# Patient Record
Sex: MALE | Race: WHITE | NOT HISPANIC OR LATINO | Employment: FULL TIME | URBAN - METROPOLITAN AREA
[De-identification: names, ages, dates, MRNs, and addresses within clinical notes are randomized per-mention and may not be internally consistent; named-entity substitution may affect disease eponyms.]

---

## 2019-02-13 ENCOUNTER — EVALUATION (OUTPATIENT)
Dept: PHYSICAL THERAPY | Facility: CLINIC | Age: 44
End: 2019-02-13
Payer: COMMERCIAL

## 2019-02-13 ENCOUNTER — TRANSCRIBE ORDERS (OUTPATIENT)
Dept: PHYSICAL THERAPY | Facility: CLINIC | Age: 44
End: 2019-02-13

## 2019-02-13 ENCOUNTER — NURSE TRIAGE (OUTPATIENT)
Dept: PHYSICAL THERAPY | Facility: OTHER | Age: 44
End: 2019-02-13

## 2019-02-13 ENCOUNTER — TELEPHONE (OUTPATIENT)
Dept: PHYSICAL THERAPY | Facility: OTHER | Age: 44
End: 2019-02-13

## 2019-02-13 ENCOUNTER — APPOINTMENT (EMERGENCY)
Dept: CT IMAGING | Facility: HOSPITAL | Age: 44
End: 2019-02-13
Payer: COMMERCIAL

## 2019-02-13 ENCOUNTER — APPOINTMENT (EMERGENCY)
Dept: RADIOLOGY | Facility: HOSPITAL | Age: 44
End: 2019-02-13
Payer: COMMERCIAL

## 2019-02-13 ENCOUNTER — HOSPITAL ENCOUNTER (EMERGENCY)
Facility: HOSPITAL | Age: 44
Discharge: HOME | End: 2019-02-13
Attending: EMERGENCY MEDICINE
Payer: COMMERCIAL

## 2019-02-13 VITALS
OXYGEN SATURATION: 100 % | HEART RATE: 57 BPM | DIASTOLIC BLOOD PRESSURE: 85 MMHG | WEIGHT: 234 LBS | RESPIRATION RATE: 18 BRPM | SYSTOLIC BLOOD PRESSURE: 134 MMHG | TEMPERATURE: 98.2 F

## 2019-02-13 VITALS — HEART RATE: 67 BPM | SYSTOLIC BLOOD PRESSURE: 110 MMHG | TEMPERATURE: 99.4 F | DIASTOLIC BLOOD PRESSURE: 70 MMHG

## 2019-02-13 DIAGNOSIS — M54.41 ACUTE BILATERAL LOW BACK PAIN WITH RIGHT-SIDED SCIATICA: Primary | ICD-10-CM

## 2019-02-13 DIAGNOSIS — M54.41 ACUTE BACK PAIN WITH SCIATICA, RIGHT: Primary | ICD-10-CM

## 2019-02-13 DIAGNOSIS — M54.16 LEFT LUMBAR RADICULITIS: Primary | ICD-10-CM

## 2019-02-13 LAB
ANION GAP SERPL CALCULATED.3IONS-SCNC: 7 MMOL/L (ref 4–13)
BACTERIA UR QL AUTO: ABNORMAL /HPF
BASOPHILS # BLD AUTO: 0.03 THOUSANDS/ΜL (ref 0–0.1)
BASOPHILS NFR BLD AUTO: 0 % (ref 0–1)
BILIRUB UR QL STRIP: NEGATIVE
BUN SERPL-MCNC: 7 MG/DL (ref 5–25)
CALCIUM SERPL-MCNC: 8.8 MG/DL (ref 8.3–10.1)
CHLORIDE SERPL-SCNC: 101 MMOL/L (ref 100–108)
CLARITY UR: CLEAR
CO2 SERPL-SCNC: 30 MMOL/L (ref 21–32)
COLOR UR: YELLOW
CREAT SERPL-MCNC: 0.88 MG/DL (ref 0.6–1.3)
EOSINOPHIL # BLD AUTO: 0.17 THOUSAND/ΜL (ref 0–0.61)
EOSINOPHIL NFR BLD AUTO: 2 % (ref 0–6)
ERYTHROCYTE [DISTWIDTH] IN BLOOD BY AUTOMATED COUNT: 12.4 % (ref 11.6–15.1)
GFR SERPL CREATININE-BSD FRML MDRD: 104 ML/MIN/1.73SQ M
GLUCOSE SERPL-MCNC: 92 MG/DL (ref 65–140)
GLUCOSE UR STRIP-MCNC: NEGATIVE MG/DL
HCT VFR BLD AUTO: 41.5 % (ref 36.5–49.3)
HGB BLD-MCNC: 14.4 G/DL (ref 12–17)
HGB UR QL STRIP.AUTO: ABNORMAL
HOLD SPECIMEN: NORMAL
IMM GRANULOCYTES # BLD AUTO: 0.01 THOUSAND/UL (ref 0–0.2)
IMM GRANULOCYTES NFR BLD AUTO: 0 % (ref 0–2)
KETONES UR STRIP-MCNC: NEGATIVE MG/DL
LEUKOCYTE ESTERASE UR QL STRIP: NEGATIVE
LYMPHOCYTES # BLD AUTO: 1.47 THOUSANDS/ΜL (ref 0.6–4.47)
LYMPHOCYTES NFR BLD AUTO: 19 % (ref 14–44)
MCH RBC QN AUTO: 31.4 PG (ref 26.8–34.3)
MCHC RBC AUTO-ENTMCNC: 34.7 G/DL (ref 31.4–37.4)
MCV RBC AUTO: 90 FL (ref 82–98)
MONOCYTES # BLD AUTO: 0.48 THOUSAND/ΜL (ref 0.17–1.22)
MONOCYTES NFR BLD AUTO: 6 % (ref 4–12)
NEUTROPHILS # BLD AUTO: 5.77 THOUSANDS/ΜL (ref 1.85–7.62)
NEUTS SEG NFR BLD AUTO: 73 % (ref 43–75)
NITRITE UR QL STRIP: NEGATIVE
NON-SQ EPI CELLS URNS QL MICRO: ABNORMAL /HPF
NRBC BLD AUTO-RTO: 0 /100 WBCS
PH UR STRIP.AUTO: 6 [PH] (ref 4.5–8)
PLATELET # BLD AUTO: 194 THOUSANDS/UL (ref 149–390)
PMV BLD AUTO: 10.8 FL (ref 8.9–12.7)
POTASSIUM SERPL-SCNC: 3.9 MMOL/L (ref 3.5–5.3)
PROT UR STRIP-MCNC: NEGATIVE MG/DL
RBC # BLD AUTO: 4.59 MILLION/UL (ref 3.88–5.62)
RBC #/AREA URNS AUTO: ABNORMAL /HPF
SODIUM SERPL-SCNC: 138 MMOL/L (ref 136–145)
SP GR UR STRIP.AUTO: <=1.005 (ref 1–1.03)
UROBILINOGEN UR QL STRIP.AUTO: 0.2 E.U./DL
WBC # BLD AUTO: 7.93 THOUSAND/UL (ref 4.31–10.16)
WBC #/AREA URNS AUTO: ABNORMAL /HPF

## 2019-02-13 PROCEDURE — 96374 THER/PROPH/DIAG INJ IV PUSH: CPT

## 2019-02-13 PROCEDURE — G8991 OTHER PT/OT GOAL STATUS: HCPCS | Performed by: PHYSICAL THERAPIST

## 2019-02-13 PROCEDURE — 36415 COLL VENOUS BLD VENIPUNCTURE: CPT | Performed by: PHYSICIAN ASSISTANT

## 2019-02-13 PROCEDURE — 80048 BASIC METABOLIC PNL TOTAL CA: CPT | Performed by: PHYSICIAN ASSISTANT

## 2019-02-13 PROCEDURE — 96375 TX/PRO/DX INJ NEW DRUG ADDON: CPT

## 2019-02-13 PROCEDURE — 73502 X-RAY EXAM HIP UNI 2-3 VIEWS: CPT

## 2019-02-13 PROCEDURE — 97162 PT EVAL MOD COMPLEX 30 MIN: CPT | Performed by: PHYSICAL THERAPIST

## 2019-02-13 PROCEDURE — 87591 N.GONORRHOEAE DNA AMP PROB: CPT | Performed by: PHYSICIAN ASSISTANT

## 2019-02-13 PROCEDURE — 85025 COMPLETE CBC W/AUTO DIFF WBC: CPT | Performed by: PHYSICIAN ASSISTANT

## 2019-02-13 PROCEDURE — 97110 THERAPEUTIC EXERCISES: CPT | Performed by: PHYSICAL THERAPIST

## 2019-02-13 PROCEDURE — 99285 EMERGENCY DEPT VISIT HI MDM: CPT

## 2019-02-13 PROCEDURE — 36415 COLL VENOUS BLD VENIPUNCTURE: CPT

## 2019-02-13 PROCEDURE — 74176 CT ABD & PELVIS W/O CONTRAST: CPT

## 2019-02-13 PROCEDURE — 97140 MANUAL THERAPY 1/> REGIONS: CPT | Performed by: PHYSICAL THERAPIST

## 2019-02-13 PROCEDURE — 81001 URINALYSIS AUTO W/SCOPE: CPT

## 2019-02-13 PROCEDURE — 72100 X-RAY EXAM L-S SPINE 2/3 VWS: CPT

## 2019-02-13 PROCEDURE — 81003 URINALYSIS AUTO W/O SCOPE: CPT

## 2019-02-13 PROCEDURE — 87491 CHLMYD TRACH DNA AMP PROBE: CPT | Performed by: PHYSICIAN ASSISTANT

## 2019-02-13 PROCEDURE — G8990 OTHER PT/OT CURRENT STATUS: HCPCS | Performed by: PHYSICAL THERAPIST

## 2019-02-13 PROCEDURE — 97112 NEUROMUSCULAR REEDUCATION: CPT | Performed by: PHYSICAL THERAPIST

## 2019-02-13 RX ORDER — ONDANSETRON 2 MG/ML
4 INJECTION INTRAMUSCULAR; INTRAVENOUS ONCE
Status: COMPLETED | OUTPATIENT
Start: 2019-02-13 | End: 2019-02-13

## 2019-02-13 RX ORDER — NAPROXEN 500 MG/1
500 TABLET ORAL 2 TIMES DAILY WITH MEALS
Qty: 30 TABLET | Refills: 0 | Status: SHIPPED | OUTPATIENT
Start: 2019-02-13 | End: 2021-04-02

## 2019-02-13 RX ORDER — CYCLOBENZAPRINE HCL 10 MG
10 TABLET ORAL ONCE
Status: COMPLETED | OUTPATIENT
Start: 2019-02-13 | End: 2019-02-13

## 2019-02-13 RX ORDER — CYCLOBENZAPRINE HCL 10 MG
10 TABLET ORAL 2 TIMES DAILY PRN
Qty: 9 TABLET | Refills: 0 | Status: SHIPPED | OUTPATIENT
Start: 2019-02-13 | End: 2021-04-02

## 2019-02-13 RX ORDER — KETOROLAC TROMETHAMINE 30 MG/ML
15 INJECTION, SOLUTION INTRAMUSCULAR; INTRAVENOUS ONCE
Status: COMPLETED | OUTPATIENT
Start: 2019-02-13 | End: 2019-02-13

## 2019-02-13 RX ORDER — MORPHINE SULFATE 10 MG/ML
8 INJECTION, SOLUTION INTRAMUSCULAR; INTRAVENOUS ONCE
Status: COMPLETED | OUTPATIENT
Start: 2019-02-13 | End: 2019-02-13

## 2019-02-13 RX ADMIN — CYCLOBENZAPRINE HYDROCHLORIDE 10 MG: 10 TABLET, FILM COATED ORAL at 11:51

## 2019-02-13 RX ADMIN — ONDANSETRON 4 MG: 2 INJECTION INTRAMUSCULAR; INTRAVENOUS at 13:39

## 2019-02-13 RX ADMIN — MORPHINE SULFATE 8 MG: 10 INJECTION, SOLUTION INTRAMUSCULAR; INTRAVENOUS at 13:44

## 2019-02-13 RX ADMIN — KETOROLAC TROMETHAMINE 15 MG: 30 INJECTION, SOLUTION INTRAMUSCULAR at 11:49

## 2019-02-13 NOTE — ED PROVIDER NOTES
History  Chief Complaint   Patient presents with    Flank Pain     Pt states that he has kidney stones that were diagnosed on Sunday at an urgent care from blood in the urine  Patient presents emergency room with a 3 day history of left flank pain  States he was seen at an urgent care center and they dipped his urine which was positive for blood in the also did lab work and told him clinically that he had a kidney stone  Patient is quite comfortable at rest   Complains of pain that radiates to his left groin  He has had a history of left groin pain on and off for the past few months  History provided by:  Patient  Flank Pain   Pain location:  L flank  Pain radiates to:  Groin  Pain severity:  Moderate  Onset quality:  Gradual  Duration:  3 days  Timing:  Constant  Progression:  Waxing and waning  Chronicity:  New  Context: not alcohol use, not awakening from sleep, not diet changes, not eating, not laxative use, not medication withdrawal, not previous surgeries, not recent illness, not recent sexual activity, not recent travel, not retching, not sick contacts, not suspicious food intake and not trauma    Relieved by:  None tried  Worsened by: Movement and position changes  Ineffective treatments:  None tried  Associated symptoms: dysuria    Associated symptoms: no anorexia, no chest pain, no chills, no cough, no diarrhea, no fatigue, no fever, no nausea, no shortness of breath, no sore throat and no vomiting    Risk factors: no alcohol abuse, no aspirin use, not elderly, has not had multiple surgeries, no NSAID use, not obese and no recent hospitalization        None       History reviewed  No pertinent past medical history  Past Surgical History:   Procedure Laterality Date    HAND SURGERY         History reviewed  No pertinent family history  I have reviewed and agree with the history as documented      Social History     Tobacco Use    Smoking status: Current Every Day Smoker    Smokeless tobacco: Never Used   Substance Use Topics    Alcohol use: Yes     Comment: once a month    Drug use: Never        Review of Systems   Constitutional: Positive for activity change  Negative for chills, fatigue and fever  HENT: Negative for congestion, dental problem, ear discharge, ear pain, postnasal drip, rhinorrhea and sore throat  Eyes: Negative for pain, discharge, redness and itching  Respiratory: Negative for cough and shortness of breath  Cardiovascular: Negative for chest pain and palpitations  Gastrointestinal: Negative for abdominal pain, anorexia, diarrhea, nausea and vomiting  Endocrine: Negative for cold intolerance, heat intolerance, polydipsia, polyphagia and polyuria  Genitourinary: Positive for dysuria and flank pain  Negative for frequency, testicular pain and urgency  Musculoskeletal: Positive for back pain  Skin: Negative for color change, pallor and rash  Neurological: Negative for headaches  Psychiatric/Behavioral: Negative for confusion  All other systems reviewed and are negative  Physical Exam  Physical Exam   Constitutional: He is oriented to person, place, and time  He appears well-developed and well-nourished  No distress  HENT:   Head: Normocephalic  Right Ear: External ear normal    Left Ear: External ear normal    Nose: Nose normal    Eyes: Conjunctivae are normal  Right eye exhibits no discharge  Left eye exhibits no discharge  Neck: Neck supple  Cardiovascular: Normal rate, regular rhythm and normal heart sounds  Exam reveals no gallop and no friction rub  No murmur heard  Pulmonary/Chest: Effort normal and breath sounds normal  No stridor  No respiratory distress  He has no wheezes  He has no rales  Abdominal: Soft  He exhibits no distension and no mass  There is no tenderness  There is no guarding  Negative costovertebral angle tenderness   Musculoskeletal: He exhibits no edema     Neurological: He is alert and oriented to person, place, and time  Skin: Skin is warm  Capillary refill takes less than 2 seconds  He is not diaphoretic  Psychiatric: He has a normal mood and affect  His behavior is normal  Judgment and thought content normal    Nursing note and vitals reviewed  Vital Signs  ED Triage Vitals [02/13/19 1021]   Temperature Pulse Respirations Blood Pressure SpO2   98 2 °F (36 8 °C) 60 16 149/85 99 %      Temp Source Heart Rate Source Patient Position - Orthostatic VS BP Location FiO2 (%)   Oral Monitor Sitting Left arm --      Pain Score       8           Vitals:    02/13/19 1021 02/13/19 1231   BP: 149/85 134/85   Pulse: 60 57   Patient Position - Orthostatic VS: Sitting Sitting       Visual Acuity      ED Medications  Medications   ketorolac (TORADOL) injection 15 mg (15 mg Intravenous Given 2/13/19 1149)   cyclobenzaprine (FLEXERIL) tablet 10 mg (10 mg Oral Given 2/13/19 1151)   morphine (PF) 10 mg/mL injection 8 mg (8 mg Intravenous Given 2/13/19 1344)   ondansetron (ZOFRAN) injection 4 mg (4 mg Intravenous Given 2/13/19 1339)       Diagnostic Studies  Results Reviewed     Procedure Component Value Units Date/Time    [place] draw [[id number]] Collected:  02/13/19 1117    Lab Status:  Final result Specimen:  Blood from Arm, Right Updated:  02/13/19 1304    Narrative: The following orders were created for panel order [place] draw  Procedure                               Abnormality         Status                     ---------                               -----------         ------                     [place] / Yellow tube on IMDF[[id number]]                      Final result               Green / Black tube on ONKG[[id number]]                       Final result               Lavender Top 3 ml on hold[[id number]]                        Final result                 Please view results for these tests on the individual orders      Urine Microscopic [[id number]]  (Abnormal) Collected:  02/13/19 1159    Lab Status:  Final result Specimen:  Urine, Clean Catch Updated:  02/13/19 1300     RBC, UA 0-1 /hpf      WBC, UA 1-2 /hpf      Epithelial Cells Occasional /hpf      Bacteria, UA Occasional /hpf     Basic metabolic panel [[id number]] Collected:  02/13/19 1117    Lab Status:  Final result Specimen:  Blood from Arm, Right Updated:  02/13/19 1205     Sodium 138 mmol/L      Potassium 3 9 mmol/L      Chloride 101 mmol/L      CO2 30 mmol/L      ANION GAP 7 mmol/L      BUN 7 mg/dL      Creatinine 0 88 mg/dL      Glucose 92 mg/dL      Calcium 8 8 mg/dL      eGFR 104 ml/min/1 73sq m     Narrative:       National Kidney Disease Education Program recommendations are as follows:  GFR calculation is accurate only with a steady state creatinine  Chronic Kidney disease less than 60 ml/min/1 73 sq  meters  Kidney failure less than 15 ml/min/1 73 sq  meters  [facility] amplified DNA by PCR [[id number]] Collected:  02/13/19 1153    Lab Status:   In process Specimen:  Urine, Other Updated:  02/13/19 1200    CBC and differential [[id number]] Collected:  02/13/19 1117    Lab Status:  Final result Specimen:  Blood from Arm, Right Updated:  02/13/19 1200     WBC 7 93 Thousand/uL      RBC 4 59 Million/uL      Hemoglobin 14 4 g/dL      Hematocrit 41 5 %      MCV 90 fL      MCH 31 4 pg      MCHC 34 7 g/dL      RDW 12 4 %      MPV 10 8 fL      Platelets 697 Thousands/uL      nRBC 0 /100 WBCs      Neutrophils Relative 73 %      Immat GRANS % 0 %      Lymphocytes Relative 19 %      Monocytes Relative 6 %      Eosinophils Relative 2 %      Basophils Relative 0 %      Neutrophils Absolute 5 77 Thousands/µL      Immature Grans Absolute 0 01 Thousand/uL      Lymphocytes Absolute 1 47 Thousands/µL      Monocytes Absolute 0 48 Thousand/µL      Eosinophils Absolute 0 17 Thousand/µL      Basophils Absolute 0 03 Thousands/µL     ED Urine Macroscopic [[id number]]  (Abnormal) Collected:  02/13/19 1159    Lab Status:  Final result Specimen:  Urine Updated:  02/13/19 1156     Color, UA Yellow     Clarity, UA Clear     pH, UA 6 0     Leukocytes, UA Negative     Nitrite, UA Negative     Protein, UA Negative mg/dl      Glucose, UA Negative mg/dl      Ketones, UA Negative mg/dl      Urobilinogen, UA 0 2 E U /dl      Bilirubin, UA Negative     Blood, UA Trace     Specific Gravity, UA <=1 005    Narrative:       CLINITEK RESULT                 XR spine lumbar 2 or 3 views injury   ED Interpretation by Emilio Boxer, PA-C (02/13 1412)   No fracture      Final Result by Richard Bourne DO (02/13 1541)      No acute findings  Mild degenerative changes visualized lower thoracic spine  Workstation performed: [id number]         XR hip/pelv 2-3 vws left if performed   ED Interpretation by Emilio Boxer, PA-C (02/13 1413)   No acute bony abnormality      Final Result by Richard Bourne DO (02/13 1539)      No acute osseous abnormality  Moderately advanced right hip osteoarthritis  Workstation performed: [id number]         CT renal stone study abdomen pelvis without contrast   ED Interpretation by Emilio Boxer, PA-C (02/13 1411)   No urinary tract calculi or hydronephrosis  Final Result by Dillon Rowland MD (02/13 1242)      No urinary tract calculi or hydronephrosis  Workstation performed: [id number]                    Procedures  Procedures       Phone Contacts  ED Phone Contact    ED Course  ED Course as of Feb 13 1709   Wed Feb 13, 2019   1425 Patient is feeling better with the medications  Patient is going to be discharged with the treatment for lumbar radiculopathy he was given a prescription for Naprosyn as well as Flexeril  Was given a referral to the [facility] asked him to follow up with the family physician in 2 weeks to have a recheck urine to make sure that his hematuria is resolved  Is an active smoker and has not increased risks of bladder cancer as well as a renal cell carcinoma    He understands and will follow up in set himself up with a family physician  Should he have any further problems, he will return to the emergency room for repeat exam                                   MDM  Number of Diagnoses or Management Options  Left lumbar radiculitis: new and requires workup     Amount and/or Complexity of Data Reviewed  Clinical lab tests: ordered and reviewed  Tests in the radiology section of CPT®: ordered and reviewed  Tests in the medicine section of CPT®: ordered and reviewed    Risk of Complications, Morbidity, and/or Mortality  Presenting problems: high  Diagnostic procedures: high  Management options: high  General comments: Patient presents emergency room with the left flank pain  Was seen at an urgent care center, 2 days ago who clinically diagnosed with a kidney stone and told him that he had abnormal laboratory test and positive blood in his urine  Presents to the emergency room today because he has persistent pain  States the pain is worse with range of motion  He denies any numbness, tingling, weakness  He does have radicular symptoms of pain into his groin and left thigh  He was seen and examined  Clinically he had a musculoskeletal strain of his lumbar spine  He had normal reflex exam   He was medicated with Toradol, morphine, Zofran and had good results from this medication  He was insistent on a CT scan of his abdomen and pelvis to rule out a stone which was perfectly acceptable  This scan was completely normal   Patient was discharged home with a prescription for Naprosyn and Flexeril  Was referred to the [address] for further evaluation of his back pain and treatment  He was given a pamphlet to of find a primary care provider within the [facility]  Should his symptoms worsen in the meantime, we will see her back in the emergency room      Patient Progress  Patient progress: stable      Disposition  Final diagnoses:   Left lumbar radiculitis - L2-3 left radiculopathy Time reflects when diagnosis was documented in both MDM as applicable and the Disposition within this note     Time User Action Codes Description Comment    2/13/2019  2:22 PM Lisa Ortiz Mariama [M54 16] Left lumbar radiculitis     2/13/2019  2:22 PM Cleopatra Winters [M54 16] Left lumbar radiculitis L2-3 left radiculopathy      ED Disposition     ED Disposition Condition Date/Time Comment    Discharge Stable Wed Feb 13, 2019  2:22 PM Conner Bolaños discharge to home/self care              Follow-up Information    None         Discharge Medication List as of 2/13/2019  2:24 PM      START taking these medications    Details   cyclobenzaprine (FLEXERIL) 10 mg tablet Take 1 tablet (10 mg total) by mouth 2 (two) times a day as needed for muscle spasms for up to 9 days, Starting Wed 2/13/2019, Until Fri 2/22/2019, Normal      naproxen (NAPROSYN) 500 mg tablet Take 1 tablet (500 mg total) by mouth 2 (two) times a day with meals, Starting Wed 2/13/2019, Normal               ED Provider  Electronically Signed by           Raquel Mishra PA-C  02/13/19 2808

## 2019-02-13 NOTE — LETTER
2019    Jacey Tuttle MD  [address]  [address]  [address]    Patient: Isaura Scott  YOB: 1975   Date of Visit: 2019      Dear Dr Brannon Brochure:    One of your patients, Isaura Scott, was referred to me by the [facility] program   Please see the evaluation summary attached below  If care is required beyond 30 days to help your patient reach their goals, I will send you a request for signature on the plan of care  If you have any questions or concerns, please don't hesitate to call  Sincerely,    Eric Donahue, PT      Primary Care Provider:      I certify that I have read the below Plan of Care and certify the need for these services furnished under this plan of treatment while under my care  Jacey Tuttle MD  [address]  [address]  [facility] [address]  [place] [place]           PT Evaluation     Today's date: 2019  Patient name: Isaura Scott  : 1975  MRN: [id number]  Referring provider: Lani Sahni, PT  Dx:   Encounter Diagnosis     ICD-10-CM    1  Acute bilateral low back pain with right-sided sciatica M54 41                   Assessment  Assessment details: Patient is a 37 y o  male who presents to [place] [address] program with the above listed impairments  Patients fits into a specific exercise treatment category due to probable disc pathology and would benefit from skilled PT services to address these impairments and to maximize function  Duration of symptoms: 3 days  ALBER: Sat down in car and twisted, felt a pop  Location of symptoms: Low back, into right groin, occasionally left side, and has had LE pain  Presents with moderate complexity category  Was given pain meds at the ER today  No other referrals necessary at this time  No red flags noted  Patient reports pain had decreased to 3/10 post treatment  He reports he has nearly no pain  Impairments: abnormal muscle firing, abnormal or restricted ROM, activity intolerance, impaired physical strength, lacks appropriate home exercise program, pain with function, poor posture  and poor body mechanics  Barriers to therapy: Work schedule  Can only attend PT after 5:30 pm     Understanding of Dx/Px/POC: good   Prognosis: good    Goals  Impairment Goals  - Decrease pain by 50% in 4 weeks  - Increase bilateral flexibility by 50% in 4 weeks  - Increase bilateral lower extremity strength globally to 4/5 in 4 weeks  - Increase bilateral hip abductor and external rotator strength strength to 4/5  4 weeks  Functional Goals  - Return to Prior Level of Function in 4 weeks  - Patient will be independent with HEP in 4 weeks      Plan  Patient would benefit from: skilled physical therapy  Planned modality interventions: cryotherapy, thermotherapy: hydrocollator packs and TENS  Planned therapy interventions: home exercise program, graded exercise, functional ROM exercises, flexibility, body mechanics training, postural training, patient education, therapeutic activities, therapeutic exercise, manual therapy, joint mobilization and neuromuscular re-education  Frequency: 2x week  Duration in weeks: 4  Treatment plan discussed with: patient and PCP        Subjective Evaluation    Pain  Current pain ratin  At best pain rating: 3  At worst pain rating: 10  Location: Low back pain, and right groin    Patient Goals  Patient goal: He wants to feel better, and get back to work  WORK:  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 [facility]  He drives into work every day 6 days a week  He works from 3 am to 6 pm   He is a [employer]  He does sheet rock, heavy lifting, and a lot of bending  HOME LIFE: He lives with wife and youngest son  His son is 16 y o  HOBBIES/EXERCISE: He enjoys bowling and golf      PAIN LOCATION/DESCRIPTORS:  Patient reports that he has pain in the low back  He feels pain down into his groin on the right typically, but doesn't feel that currently  He reports pain in the left hip  He denies any numbness or tingling in legs  However, he reports that he will get cramps in R>L leg  LBP: knifelike, knot-like, constant, varies, deep  Right groin pain: pulling, shooting pain, intermittent, varies, deep  AGGRAVATING FACTORS:    Back: sitting, twisting, donning pants, He denies pain with bending forward  Lifting things to the left  Groin: walking, stairs  EASES: sits with a lumbar pillow in car  Morphine  Ice pack  Icy hot  LATENCY:  30 mins  DAY PATTERN:  Pain is worse at night  IMAGING:  CT: negative, x-rays: negative  PLOF:  100%  He reports though that he has had low back pain for years  He notes that his back has not bothered him bad for 7 years  However, he will have little tweaks of LBP  HISTORY OF CURRENT INJURY: Patient reports that on Sunday this week, he went to get into his car  Patient reports that he was sitting in the car, turned to reach, and felt a pop when he turned  He reports that he went about his day, and notes that his pain started to move distally on the left side  He went to urgent care Sunday night and was diagnosed with kidney stones  He was told if it wasn't better, to go to the hospital   Today he woke with the same amount of pain, and went to the ER  He was told in the ER that he didn't have kidney stones, and that the pain was likely coming from his back  He was sent to PT for further evaluation  He has been off work since Sunday  He is planning on returning to work tomorrow  Patient has had morphine today along with some pain meds that he was given at the ER  Objective     Concurrent Complaints  Positive for night pain and disturbed sleep   Negative for bladder dysfunction, bowel dysfunction, saddle (S4) numbness, cardiac problem, kidney problem, gallbladder problem, stomach problem, ulcer, appendix problem, spleen problem, pancreas problem, history of cancer, history of trauma and infection    Postural Observations    Additional Postural Observation Details  Sits with forward posture throughout eval       Palpation   Left   Tenderness of the erector spinae and quadratus lumborum  Neurological Testing     Additional Neurological Details  Neuro Screen of the Lower Quarter:     Dermatomes: equal and symmetrical bilaterally throughout  Myotomes: equal and symmetrical bilaterally throughout  DTR: 2+ bilateral and symmetrical throughout         Active Range of Motion     Additional Active Range of Motion Details  LS AROM:   Flexion: 75%  Extension: 50% Increased back pain  SideBending right: 100%   SideBending left: 100% Increased back and left side pain  Rotation right: 75%  Rotation left: 75% Increased back and left side pain    Joint Play   Joints within functional limits: L2, L3, L4, L5 and S1     Pain: T12 and L1   Mechanical Assessment    Cervical      Thoracic    Lying extension: repeated movements  Pain location: centralized  Pain intensity: better  Pain level: decreased  Prone lying> RADHA> PPU    Lumbar      Strength/Myotome Testing     Left Hip   Planes of Motion   Flexion: 4-    Right Hip   Planes of Motion   Flexion: 4+    Tests     Lumbar     Left   Negative slump test      Right   Negative slump test      Left Pelvic Girdle/Sacrum   Negative: active SLR test      Right Pelvic Girdle/Sacrum   Negative: active SLR test      Left Hip   Negative ENDER  Right Hip   Negative ENDER       General Comments:    Lower quarter screen   Hips: unremarkable  Knees: unremarkable  Foot/ankle: unremarkable    Hip Comments   Decreased hip extension with ambulation, R>L          Diagnosis: LS strain, and Extension preference   Precautions: n/a   Manual Therapy 2/13       LS PA's HJS,PT       Rib PA HJS, PT       Trigger point release HJS, PT                       Exercise Diary         Prone lying 2 mins       RADHA 5 mins       PPU 20x        Standing extensions 10x       Hip flexor stretch in standing 3x30 sec                                                                                                                               Modalities        CP/IFC PRN

## 2019-02-13 NOTE — ED NOTES
PT awake and alert, no distress noted  No other questions upon d/c       April Tino Ervin, RN  02/13/19 2156

## 2019-02-13 NOTE — PROGRESS NOTES
PT Evaluation     Today's date: 2019  Patient name: Eugenio Dorsey  : 1975  MRN: [id number]  Referring provider: Brien Mills PT  Dx:   Encounter Diagnosis     ICD-10-CM    1  Acute bilateral low back pain with right-sided sciatica M54 41                   Assessment  Assessment details: Patient is a 37 y o  male who presents to [place] [address] program with the above listed impairments  Patients fits into a specific exercise treatment category due to probable disc pathology and would benefit from skilled PT services to address these impairments and to maximize function  Duration of symptoms: 3 days  ALBER: Sat down in car and twisted, felt a pop  Location of symptoms: Low back, into right groin, occasionally left side, and has had LE pain  Presents with moderate complexity category  Was given pain meds at the ER today  No other referrals necessary at this time  No red flags noted  Patient reports pain had decreased to 3/10 post treatment  He reports he has nearly no pain  Impairments: abnormal muscle firing, abnormal or restricted ROM, activity intolerance, impaired physical strength, lacks appropriate home exercise program, pain with function, poor posture  and poor body mechanics  Barriers to therapy: Work schedule  Can only attend PT after 5:30 pm     Understanding of Dx/Px/POC: good   Prognosis: good    Goals  Impairment Goals  - Decrease pain by 50% in 4 weeks  - Increase bilateral flexibility by 50% in 4 weeks  - Increase bilateral lower extremity strength globally to 4/5 in 4 weeks  - Increase bilateral hip abductor and external rotator strength strength to 4/5  4 weeks      Functional Goals  - Return to Prior Level of Function in 4 weeks  - Patient will be independent with HEP in 4 weeks      Plan  Patient would benefit from: skilled physical therapy  Planned modality interventions: cryotherapy, thermotherapy: hydrocollator packs and TENS  Planned therapy interventions: home exercise program, graded exercise, functional ROM exercises, flexibility, body mechanics training, postural training, patient education, therapeutic activities, therapeutic exercise, manual therapy, joint mobilization and neuromuscular re-education  Frequency: 2x week  Duration in weeks: 4  Treatment plan discussed with: patient and PCP        Subjective Evaluation    Pain  Current pain ratin  At best pain rating: 3  At worst pain rating: 10  Location: Low back pain, and right groin    Patient Goals  Patient goal: He wants to feel better, and get back to work  WORK:  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 [facility]  He drives into work every day 6 days a week  He works from 3 am to 6 pm   He is a [employer]  He does sheet rock, heavy lifting, and a lot of bending  HOME LIFE: He lives with wife and youngest son  His son is 16 y o  HOBBIES/EXERCISE: He enjoys bowling and golf  PAIN LOCATION/DESCRIPTORS:  Patient reports that he has pain in the low back  He feels pain down into his groin on the right typically, but doesn't feel that currently  He reports pain in the left hip  He denies any numbness or tingling in legs  However, he reports that he will get cramps in R>L leg  LBP: knifelike, knot-like, constant, varies, deep  Right groin pain: pulling, shooting pain, intermittent, varies, deep  AGGRAVATING FACTORS:    Back: sitting, twisting, donning pants, He denies pain with bending forward  Lifting things to the left  Groin: walking, stairs  EASES: sits with a lumbar pillow in car  Morphine  Ice pack  Icy hot  LATENCY:  30 mins  DAY PATTERN:  Pain is worse at night  IMAGING:  CT: negative, x-rays: negative  PLOF:  100%  He reports though that he has had low back pain for years  He notes that his back has not bothered him bad for 7 years  However, he will have little tweaks of LBP      HISTORY OF CURRENT INJURY: Patient reports that on Sunday this week, he went to get into his car  Patient reports that he was sitting in the car, turned to reach, and felt a pop when he turned  He reports that he went about his day, and notes that his pain started to move distally on the left side  He went to urgent care Sunday night and was diagnosed with kidney stones  He was told if it wasn't better, to go to the hospital   Today he woke with the same amount of pain, and went to the ER  He was told in the ER that he didn't have kidney stones, and that the pain was likely coming from his back  He was sent to PT for further evaluation  He has been off work since Sunday  He is planning on returning to work tomorrow  Patient has had morphine today along with some pain meds that he was given at the ER  Objective     Concurrent Complaints  Positive for night pain and disturbed sleep  Negative for bladder dysfunction, bowel dysfunction, saddle (S4) numbness, cardiac problem, kidney problem, gallbladder problem, stomach problem, ulcer, appendix problem, spleen problem, pancreas problem, history of cancer, history of trauma and infection    Postural Observations    Additional Postural Observation Details  Sits with forward posture throughout eval       Palpation   Left   Tenderness of the erector spinae and quadratus lumborum       Neurological Testing     Additional Neurological Details  Neuro Screen of the Lower Quarter:     Dermatomes: equal and symmetrical bilaterally throughout  Myotomes: equal and symmetrical bilaterally throughout  DTR: 2+ bilateral and symmetrical throughout         Active Range of Motion     Additional Active Range of Motion Details  LS AROM:   Flexion: 75%  Extension: 50% Increased back pain  SideBending right: 100%   SideBending left: 100% Increased back and left side pain  Rotation right: 75%  Rotation left: 75% Increased back and left side pain    Joint Play   Joints within functional limits: L2, L3, L4, L5 and S1     Pain: T12 and L1   Mechanical Assessment    Cervical      Thoracic    Lying extension: repeated movements  Pain location: centralized  Pain intensity: better  Pain level: decreased  Prone lying> RADHA> PPU    Lumbar      Strength/Myotome Testing     Left Hip   Planes of Motion   Flexion: 4-    Right Hip   Planes of Motion   Flexion: 4+    Tests     Lumbar     Left   Negative slump test      Right   Negative slump test      Left Pelvic Girdle/Sacrum   Negative: active SLR test      Right Pelvic Girdle/Sacrum   Negative: active SLR test      Left Hip   Negative ENDER  Right Hip   Negative ENDER       General Comments:    Lower quarter screen   Hips: unremarkable  Knees: unremarkable  Foot/ankle: unremarkable    Hip Comments   Decreased hip extension with ambulation, R>L          Diagnosis: LS strain, and Extension preference   Precautions: n/a   Manual Therapy 2/13       LS PA's HJS,PT       Rib PA HJS, PT       Trigger point release HJS, PT                       Exercise Diary         Prone lying 2 mins       RADHA 5 mins       PPU 20x        Standing extensions 10x       Hip flexor stretch in standing 3x30 sec                                                                                                                               Modalities        CP/IFC PRN

## 2019-02-13 NOTE — TELEPHONE ENCOUNTER
Wife, Reg Francisco, competing intake with  present and engaging in call  Wife stated that her  went to [facility] on Sunday and was told he had a Kidney  No imaging was done at the [facility] -- just blood work and urine sample    Previous Treatment - Previous Treatment  Previous evaluation: [facility] 2/10/19 and ED 2/12/19  Current provider: none  Diagnostics: CT (to r/o kidney stone)  Previous treatment: home stretches    Background - Initial Assessment  Clinical complaint: low back pain, left sided, travels around to stomach and groin  Also limping when walking with right leg  Having some pain in the right leg  Date of onset: 2/10/19; limping started 1 month ago  Mechanism of injury: unk    Protocols used: [facility] AMB COMPREHENSIVE SPINE PROGRAM PROTOCOL    Wife reports her husbands legs have been tingling while laying in bed  Also reports the tingling wakes him up from his sleep  RN provided patient an overview of the [facility] Program including: triage assessment, physical therapy, and additional specialist referral options based on symptoms and chronicity  RN explained that Comprehensive Spine program is physical therapy based with the goal of getting the patient scheduled in 24 - 48 hrs  Further explained that we schedule patients for a consultation with one of our advanced spine physical therapists for evaluation which may include treatment  These therapists have their doctorate in PTx  If needed, a telemed visit could occur at the same time of this consultation if muscle relaxers or a higher dose NSAID is needed  The goal is to get patients treated as quickly as possible so they can return to their normal activities  Research has shown great results when starting physical therapy sooner than later which helps reduce imaging and costs to patients  Wife and Pt  Verbalized understanding of this and wished to proceed        RN placed referral to SHANI MCLEOD) and [facility]

## 2019-02-14 LAB
C TRACH DNA SPEC QL NAA+PROBE: NEGATIVE
N GONORRHOEA DNA SPEC QL NAA+PROBE: NEGATIVE

## 2019-02-21 ENCOUNTER — OFFICE VISIT (OUTPATIENT)
Dept: PHYSICAL THERAPY | Facility: CLINIC | Age: 44
End: 2019-02-21
Payer: COMMERCIAL

## 2019-02-21 DIAGNOSIS — M54.41 ACUTE BILATERAL LOW BACK PAIN WITH RIGHT-SIDED SCIATICA: Primary | ICD-10-CM

## 2019-02-21 PROCEDURE — 97110 THERAPEUTIC EXERCISES: CPT | Performed by: PHYSICAL THERAPIST

## 2019-02-21 PROCEDURE — 97140 MANUAL THERAPY 1/> REGIONS: CPT | Performed by: PHYSICAL THERAPIST

## 2019-02-21 NOTE — PROGRESS NOTES
Daily Note     Today's date: 2019  Patient name: Emil Vizcarra  : 1975  MRN: [id number]  Referring provider: Thomas Stewart, PT  Dx:   Encounter Diagnosis     ICD-10-CM    1  Acute bilateral low back pain with right-sided sciatica M54 41        Start Time:   Stop Time:   Total time in clinic (min): 60 minutes    Subjective: Pt reports in general he feels "better" and notices he no longer is experiencing radicular symptoms into LE however feels only LBP on R  Pt acknowledges pain at worst 6/10 provoked with sitting or walking  "I don't feel like I walk normal I'm favoring the R leg still " Pt reports he does not perform HEP during his day, only at home after work  Pt taking less medication, using fewer modalities  Pt had many questions pertinent to his POC and was very receptive and very thankful upon explanations  Objective: See treatment diary below  REIL with pt OP, REIL with PT OP  Pt able to abolish symptoms however upon amb symptoms return 3/10 R lumbar spine  Rev Hep  R trendelenburg (+) with amb  Diagnosis: LS strain, and Extension preference   Precautions: n/a   Manual Therapy 19      LS PA's HJS,PT KT      Rib PA HJS, PT       Trigger point release HJS, PT                       Exercise Diary   2      Prone lying 2 mins 10'      RADHA 5 mins 1' x 2      PPU 20x  REIL 5x 2      Standing extensions 10x JEFF 5x2 over fulcrum 5x elbows on wall 2x sustained 20s      Hip flexor stretch in standing 3x30 sec --      Prone quad str  W/ SOS 5x10s ea                                                                                                                      Modalities        CP/IFC PRN  TENs 10'                          Assessment: Tolerated treatment well  Patient symtoms can abolish with extension based tretament approach however signficinat gait deviations appear to irritate symptoms with any short duraiton walkin form 0-3/10 immediately         Plan: Continue per plan of care

## 2019-02-27 ENCOUNTER — OFFICE VISIT (OUTPATIENT)
Dept: PHYSICAL THERAPY | Facility: CLINIC | Age: 44
End: 2019-02-27
Payer: COMMERCIAL

## 2019-02-27 DIAGNOSIS — M54.41 ACUTE BILATERAL LOW BACK PAIN WITH RIGHT-SIDED SCIATICA: Primary | ICD-10-CM

## 2019-02-27 PROCEDURE — 97110 THERAPEUTIC EXERCISES: CPT | Performed by: PHYSICAL THERAPIST

## 2019-02-27 PROCEDURE — 97140 MANUAL THERAPY 1/> REGIONS: CPT | Performed by: PHYSICAL THERAPIST

## 2019-02-27 NOTE — PROGRESS NOTES
Daily Note     Today's date: 2019  Patient name: Emily Elizabeth  : 1975  MRN: [id number]  Referring provider: Jessenia Delgado MD  Dx:   Encounter Diagnosis     ICD-10-CM    1  Acute bilateral low back pain with right-sided sciatica M54 41        Start Time: 1740  Stop Time: 1835  Total time in clinic (min): 55 minutes    Subjective: Pt reports ongoing symptoms, he feels symptoms in low back and R buttock and R ant thigh and groin  Pt reports HEP abolishes symptoms for 10 minutes  Pt also reports he worked every day since last seen, over weekend as well  Objective: See treatment diary below  Lateral symptoms centralize with SGIS against wall however ongoing deficits with various change of positioning  Supine with lumbar roll nil symptoms  Symptom provocation with TrvA with abd, otherwise no pain  Diagnosis: LS strain, and Extension preference   Precautions: n/a   Manual Therapy 19     LS PA's HJS,PT KT KT     Rib PA HJS, PT       Trigger point release HJS, PT                       Exercise Diary   2 3     Prone lying 2 mins 10' 10'     RADHA 5 mins 1' x 2 1'x1     PPU 20x  REIL 5x 2      Standing extensions 10x JEFF 5x2 over fulcrum 5x elbows on wall 2x sustained 20s      Hip flexor stretch in standing 3x30 sec --      Prone quad str  W/ SOS 5x10s ea      TrvA   10x     TrvA with Ball sq   10x     TrvA with Tband hip abd   10x Green     R SGIS against wall   2x10                                                                                     Modalities        CP/IFC PRN  TENs 10' MHP with TENs 10'                           Assessment: Tolerated treatment well  Patient demo ongoing fluctuating symptoms however generaly of lesser problematic nature than previously  Plan: Continue per plan of care

## 2019-02-28 ENCOUNTER — APPOINTMENT (OUTPATIENT)
Dept: PHYSICAL THERAPY | Facility: CLINIC | Age: 44
End: 2019-02-28
Payer: COMMERCIAL

## 2019-03-06 ENCOUNTER — OFFICE VISIT (OUTPATIENT)
Dept: PHYSICAL THERAPY | Facility: CLINIC | Age: 44
End: 2019-03-06
Payer: COMMERCIAL

## 2019-03-06 DIAGNOSIS — M54.41 ACUTE BILATERAL LOW BACK PAIN WITH RIGHT-SIDED SCIATICA: Primary | ICD-10-CM

## 2019-03-06 PROCEDURE — 97110 THERAPEUTIC EXERCISES: CPT | Performed by: PHYSICAL THERAPIST

## 2019-03-06 NOTE — PROGRESS NOTES
Daily Note     Today's date: 3/6/2019  Patient name: Rickey Mcdaniel  : 1975  MRN: [id number]  Referring provider: Logan Mckenzie, PT  Dx:   Encounter Diagnosis     ICD-10-CM    1  Acute bilateral low back pain with right-sided sciatica M54 41        Start Time: 1730  Stop Time: 1830  Total time in clinic (min): 60 minutes    Subjective: Pt reports he is no longer experiencing pain with sitting he is noting R LBP and R hip/groin discomfort while walking for work  Pt denies having any rest from work and incessantly on his feet  Pt pain during work day 8/10  Pt pain upon arrival 8/10  Pain dec to 0/10while in prone lying on wedge, 3/10 upon transitioning to feet in standing and then 5/10with walking in buttock and R sided lumbar spine  Pt acknowledges major symptom relief and imrpoved gait with flexion rotation component  Objective: See treatment diary below  Initiated flexion rotation in supine and sidelying today with dec pain  Diagnosis: LS strain, and Extension preference   Precautions: n/a   Manual Therapy 2/13 2/21/19 2/27/19 3/6/19    LS PA's HJS,PT KT KT KT    Rib PA HJS, PT       Trigger point release HJS, PT       Flex/rot mob L/S (L)    2x10            Exercise Diary   2 3 4    Prone lying 2 mins 10' 10' In extension on wedge with 1-2 pillow 3' x 2    RADHA 5 mins 1' x 2 1'x1     PPU 20x  REIL 5x 2      Standing extensions 10x JEFF 5x2 over fulcrum 5x elbows on wall 2x sustained 20s      Hip flexor stretch in standing 3x30 sec --      Prone quad str  W/ SOS 5x10s ea      TrvA   10x     TrvA with Ball sq   10x     TrvA with Tband hip abd   10x Green     R SGIS against wall   2x10                                                                                     Modalities        CP/IFC PRN  TENs 10' MHP with TENs 10' 10'                          Assessment: Tolerated treatment well  Patient demo good repsonse to ongoing repeated otions and mechanical response   Pt ongoing lakc of rest andconitnues activation throughout work continue to aggravate symptoms and inc neural sensitivity  Plan: Continue per plan of care

## 2019-03-13 ENCOUNTER — OFFICE VISIT (OUTPATIENT)
Dept: PHYSICAL THERAPY | Facility: CLINIC | Age: 44
End: 2019-03-13
Payer: COMMERCIAL

## 2019-03-13 DIAGNOSIS — M54.41 ACUTE BILATERAL LOW BACK PAIN WITH RIGHT-SIDED SCIATICA: Primary | ICD-10-CM

## 2019-03-13 PROCEDURE — 97110 THERAPEUTIC EXERCISES: CPT | Performed by: PHYSICAL THERAPIST

## 2019-03-14 NOTE — PROGRESS NOTES
PT Re-Evaluation     Today's date: 3/13/2019  Patient name: Eugenio Dorsey  : 1975  MRN: [id number]  Referring provider: Brien Mills PT  Dx:   Encounter Diagnosis     ICD-10-CM    1  Acute bilateral low back pain with right-sided sciatica M54 41        Start Time: 1745  Stop Time: 1830  Total time in clinic (min): 45 minutes    Assessment  Assessment details: Patient is a 37 y o  male who has been participating in skilled PT since time of IE and demo significant improvement in symptoms with application of HEP however is unable to apply HEP during his work day which is most provocative of his symptoms  Pt feels his symptoms would have abolished with regular application  Pt is very frustrated with his chronic pain and felt these stretches were helpful yet his work schedule disallows performance  Pt has been seen 30 days and is recommended to f/u with MD  Pt is also interested in alternative approaches such as acupuncture  Hold PT until MD clearance, after which cont with skilled PT to maximize return to PLOF  Pt may also benefit from electrical stimulation home unit for pain reduction  Impairments: abnormal muscle firing, abnormal or restricted ROM, activity intolerance, impaired physical strength, lacks appropriate home exercise program, pain with function, poor posture  and poor body mechanics  Barriers to therapy: Work schedule  Can only attend PT after 5:30 pm     Understanding of Dx/Px/POC: good   Prognosis: good    Goals  Impairment Goals - partially met/ongoing  - Decrease pain by 50% in 4 weeks  - Increase bilateral flexibility by 50% in 4 weeks  - Increase bilateral lower extremity strength globally to 4/5 in 4 weeks  - Increase bilateral hip abductor and external rotator strength strength to 4/5  4 weeks  Functional Goals - ongoing  - Return to Prior Level of Function in 4 weeks  - Patient will be independent with HEP in 4 weeks      Plan  Plan details: Cont with est POC    Patient would benefit from: skilled physical therapy  Planned modality interventions: cryotherapy, thermotherapy: hydrocollator packs and TENS  Planned therapy interventions: home exercise program, graded exercise, functional ROM exercises, flexibility, body mechanics training, postural training, patient education, therapeutic activities, therapeutic exercise, manual therapy, joint mobilization and neuromuscular re-education  Frequency: 2x week (1-2x/week)  Duration in weeks: 4  Treatment plan discussed with: patient and PCP        Subjective Evaluation    History of Present Illness  Mechanism of injury: At time of RE-EVAL 3/13/19: Pt reports major symptom reduction with application of HEP however is unable to apply during his work day which is exacerbating factor of his symptoms  At this time pt is frustrated with his lack of ability to apply HEP at work  Pain reduces dramatically with application and remains better after appts and after work hours when he s able to apply HEP  Work continues to [employer] pain  Pt is generally tolerating prolonged walking at work and sitting in truck or commute better  Pain  Current pain ratin  At best pain ratin  At worst pain rating: 10  Location: Low back pain, and right buttock    Patient Goals  Patient goals for therapy: increased strength, independence with ADLs/IADLs, return to work, increased motion and decreased pain  Patient goal: He wants to feel better, and get back to work              Objective     Concurrent Complaints  Negative for night pain, disturbed sleep, bladder dysfunction, bowel dysfunction, saddle (S4) numbness, cardiac problem, kidney problem, gallbladder problem, stomach problem, ulcer, appendix problem, spleen problem, pancreas problem, history of cancer, history of trauma and infection    Postural Observations  Seated posture: poor  Standing posture: fair  Correction of posture: makes symptoms better        Neurological Testing     Additional Neurological Details  Neuro Screen of the Lower Quarter:     Dermatomes: equal and symmetrical bilaterally throughout  Myotomes: equal and symmetrical bilaterally throughout  DTR: 2+ bilateral and symmetrical throughout         Active Range of Motion     Additional Active Range of Motion Details  LS AROM:   Flexion: WNL pain free  Extension: Min loss painfree  L SGIS WNL pain free  R SGIS min loss mild discomfort*    * discomfort abolish after 10x L flexion rotation supine    Joint Play   Joints within functional limits: T12, L1, L2, L3, L4, L5 and S1   Mechanical Assessment    Cervical      Thoracic    Lying extension: repeated movements  Pain location: centralized  Pain intensity: better  Pain level: decreased  Prone lying> RADHA> PPU    Lumbar    Lying flexion: repeated movements  Pain intensity: better  Pain level: abolished  Flexion rotation L supine    Strength/Myotome Testing     Left Hip   Planes of Motion   Flexion: 4    Right Hip   Planes of Motion   Flexion: 4+    Tests     Lumbar     Left   Negative slump test      Right   Negative slump test      Left Pelvic Girdle/Sacrum   Negative: active SLR test      Right Pelvic Girdle/Sacrum   Negative: active SLR test      Left Hip   Negative ENDER  Right Hip   Negative ENDER       General Comments:    Lower quarter screen   Hips: unremarkable  Knees: unremarkable  Foot/ankle: unremarkable    Lumbar Comments  Antalgic gait mild with pain present and trendelenburg (+)    Hip Comments   Decreased hip extension with ambulation, R>L      Flowsheet Rows      Most Recent Value   PT/OT G-Codes   Current Score  94   Projected Score  71   FOTO information reviewed  Yes          Diagnosis: LS strain, and Extension preference   Precautions: n/a   Manual Therapy 2/13 2/21/19 2/27/19 3/6/19 3/13/19   LS PA's HJS,PT KT KT KT KT   Rib PA HJS, PT       Trigger point release HJS, PT       Flex/rot mob L/S (L)    2x10 3x10 (+1x 2' sustained)           Exercise Diary   2 3 4 5   Prone lying 2 mins 10' 10' In extension on wedge with 1-2 pillow 3' x 2    RADHA 5 mins 1' x 2 1'x1     PPU 20x  REIL 5x 2      Standing extensions 10x JEFF 5x2 over fulcrum 5x elbows on wall 2x sustained 20s      Hip flexor stretch in standing 3x30 sec --      Prone quad str  W/ SOS 5x10s ea      TrvA   10x     TrvA with Ball sq   10x     TrvA with Tband hip abd   10x Green     R SGIS against wall   2x10                                                                                     Modalities        CP/IFC PRN  TENs 10' MHP with TENs 10' 10' 10'

## 2019-03-25 NOTE — PROGRESS NOTES
Self DC, spoke with pt on phone, "too busy to do anything I am so sorry" however reports an interest in accupuncture for maintenance  Referral options sent to patient via e-mail

## 2019-04-11 ENCOUNTER — OFFICE VISIT (OUTPATIENT)
Dept: URGENT CARE | Facility: CLINIC | Age: 44
End: 2019-04-11
Payer: COMMERCIAL

## 2019-04-11 VITALS
TEMPERATURE: 98.8 F | WEIGHT: 225 LBS | SYSTOLIC BLOOD PRESSURE: 139 MMHG | DIASTOLIC BLOOD PRESSURE: 79 MMHG | HEART RATE: 84 BPM | BODY MASS INDEX: 28.88 KG/M2 | RESPIRATION RATE: 20 BRPM | HEIGHT: 74 IN | OXYGEN SATURATION: 98 %

## 2019-04-11 DIAGNOSIS — G89.29 CHRONIC RIGHT-SIDED LOW BACK PAIN WITH RIGHT-SIDED SCIATICA: Primary | ICD-10-CM

## 2019-04-11 DIAGNOSIS — M54.41 CHRONIC RIGHT-SIDED LOW BACK PAIN WITH RIGHT-SIDED SCIATICA: Primary | ICD-10-CM

## 2019-04-11 PROCEDURE — 99203 OFFICE O/P NEW LOW 30 MIN: CPT | Performed by: NURSE PRACTITIONER

## 2019-04-11 RX ORDER — METAXALONE 800 MG/1
800 TABLET ORAL 3 TIMES DAILY
Qty: 30 TABLET | Refills: 0 | Status: SHIPPED | OUTPATIENT
Start: 2019-04-11 | End: 2021-04-02

## 2019-04-11 RX ORDER — PREDNISONE 10 MG/1
10 TABLET ORAL DAILY
Qty: 21 TABLET | Refills: 0 | Status: SHIPPED | OUTPATIENT
Start: 2019-04-11 | End: 2021-04-02

## 2019-04-24 ENCOUNTER — TELEPHONE (OUTPATIENT)
Dept: FAMILY MEDICINE CLINIC | Facility: CLINIC | Age: 44
End: 2019-04-24

## 2020-08-13 ENCOUNTER — TELEPHONE (OUTPATIENT)
Dept: OTHER | Facility: OTHER | Age: 45
End: 2020-08-13

## 2020-08-13 NOTE — TELEPHONE ENCOUNTER
Patient called to cancel his appointment  He is in traffic on [address] due to accident and states he will not make appt  Please call him to r/s

## 2020-08-26 ENCOUNTER — APPOINTMENT (OUTPATIENT)
Dept: RADIOLOGY | Facility: CLINIC | Age: 45
End: 2020-08-26
Payer: COMMERCIAL

## 2020-08-26 ENCOUNTER — OFFICE VISIT (OUTPATIENT)
Dept: OBGYN CLINIC | Facility: CLINIC | Age: 45
End: 2020-08-26
Payer: COMMERCIAL

## 2020-08-26 VITALS
DIASTOLIC BLOOD PRESSURE: 77 MMHG | BODY MASS INDEX: 29.89 KG/M2 | TEMPERATURE: 97.6 F | WEIGHT: 220.7 LBS | HEIGHT: 72 IN | SYSTOLIC BLOOD PRESSURE: 129 MMHG | HEART RATE: 53 BPM

## 2020-08-26 DIAGNOSIS — M25.551 RIGHT HIP PAIN: ICD-10-CM

## 2020-08-26 DIAGNOSIS — M16.11 PRIMARY OSTEOARTHRITIS OF ONE HIP, RIGHT: Primary | ICD-10-CM

## 2020-08-26 DIAGNOSIS — F17.210 CIGARETTE SMOKER ONE HALF PACK A DAY OR LESS: ICD-10-CM

## 2020-08-26 PROCEDURE — 73502 X-RAY EXAM HIP UNI 2-3 VIEWS: CPT

## 2020-08-26 PROCEDURE — 99203 OFFICE O/P NEW LOW 30 MIN: CPT | Performed by: ORTHOPAEDIC SURGERY

## 2020-08-26 NOTE — PROGRESS NOTES
Assessment/Plan:  1  Primary osteoarthritis of one hip, right     2  Right hip pain  XR hip/pelv 2-3 vws right if performed   3  Cigarette smoker one half pack a day or less       Scribe Attestation    I,:   Delvis Llamas am acting as a scribe while in the presence of the attending physician :        I,:   Annetta Funez DO personally performed the services described in this documentation    as scribed in my presence :          Marie Ruiz is a very pleasant 40-year-old male who presents today for initial evaluation of his chronic right hip pain  After reviewing his imaging and performing a thorough history and physical exam I explained that he is suffering with severe end-stage degenerative change his right hip  Due to the advanced progression of his underlying disease and is failure of conservative treatment including over-the-counter NSAID and analgesic medications, maintenance of appropriate weight, activity modification, physical therapy, and injection therapy I explained that he is a candidate for a total hip arthroplasty and would be an excellent candidate for the direct anterior approach  Unfortunately, I am unable to offer him this procedure at this time as he is currently contraindicated for surgery due to his smoking habit  We did spend time discussing the increased risk for surgery in smokers and I also counseled him on smoking cessation  I did offer him an ultrasound-guided corticosteroid injection to be administered by my partner Dr Raina Hernandez while he pursues smoking cessation  He was amenable to this plan  We will reach out to him to facilitate the appointment  I would like to see him back in three months to gauge the efficacy of the injection and check on his progress with smoking cessation  All of his questions and concerns were addressed today      Subjective: Initial evaluation for chronic right hip pain    Patient ID: Emily Elizabeth is a 50 y o  male who presents today for initial evaluation of his chronic right hip pain  He states that he has been treated for this in the past and was told last year that he would require a total hip replacement  At that time, he underwent an image guided intra-articular corticosteroid injection  He states that this helped somewhat but did not alleviate his pain  At today's visit, he describes sharp pain about the anterior aspect of his hip and groin  He states this pain can reach 8/10 on the pain scale and occurs daily  He does find his pain is somewhat better at rest   He struggles with activities of daily living including donning his socks and shoes  He also struggles with activities of enjoyment such as golf  He works as a [employer] and is quite active in his job and does find that his pain does affect his ability to perform his job responsibilities by the end of the day  He has attempted conservative treatment including over-the-counter NSAID and analgesic medications  He does still utilize Advil and Voltaren gel which helps somewhat  He has also previously attended formal physical therapy and had two intra-articular corticosteroid injections with the last occurring over a year ago  He has failed to find lasting relief with any of these interventions  He does report smoking about 1/2 pack of cigarettes per day  He denies any acute injury or trauma  Denies any distal paresthesias of the lower extremity  He does also report a positive COVID test in March but states that he has recovered and was asymptomatic  Review of Systems   Constitutional: Negative for chills, fever and unexpected weight change  HENT: Negative for hearing loss, nosebleeds and sore throat  Eyes: Negative for pain, redness and visual disturbance  Respiratory: Negative for cough, shortness of breath and wheezing  Cardiovascular: Negative for chest pain, palpitations and leg swelling  Gastrointestinal: Negative for abdominal pain, nausea and vomiting  Endocrine: Negative for polyphagia and polyuria  Genitourinary: Negative for dysuria and hematuria  Musculoskeletal: Positive for arthralgias  Negative for joint swelling and myalgias  See HPI   Skin: Negative for rash and wound  Neurological: Negative for dizziness, numbness and headaches  Psychiatric/Behavioral: Negative for decreased concentration and suicidal ideas  The patient is not nervous/anxious  History reviewed  No pertinent past medical history  Past Surgical History:   Procedure Laterality Date    HAND SURGERY         Family History   Problem Relation Age of Onset    No Known Problems Mother     No Known Problems Father        Social History     Occupational History    Not on file   Tobacco Use    Smoking status: Current Every Day Smoker     Packs/day: 0 50    Smokeless tobacco: Never Used   Substance and Sexual Activity    Alcohol use: Yes     Frequency: Monthly or less    Drug use: Never    Sexual activity: Not on file         Current Outpatient Medications:     cyclobenzaprine (FLEXERIL) 10 mg tablet, Take 1 tablet (10 mg total) by mouth 2 (two) times a day as needed for muscle spasms for up to 9 days, Disp: 9 tablet, Rfl: 0    metaxalone (SKELAXIN) 800 mg tablet, Take 1 tablet (800 mg total) by mouth 3 (three) times a day (Patient not taking: Reported on 8/26/2020), Disp: 30 tablet, Rfl: 0    naproxen (NAPROSYN) 500 mg tablet, Take 1 tablet (500 mg total) by mouth 2 (two) times a day with meals (Patient not taking: Reported on 8/26/2020), Disp: 30 tablet, Rfl: 0    predniSONE 10 mg tablet, Take 1 tablet (10 mg total) by mouth daily 60mg days 1, 50mg day 2, 40mg day 3, 30mg day 4, 20 mg day 5, 10mg day 6  (Patient not taking: Reported on 8/26/2020), Disp: 21 tablet, Rfl: 0    No Known Allergies    Objective:  Vitals:    08/26/20 1730   BP: 129/77   Pulse: (!) 53   Temp: 97 6 °F (36 4 °C)       Body mass index is 29 93 kg/m²      Right Hip Exam     Range of Motion   Right hip abduction: 50  Right hip adduction: 30  Right hip extension: 0  Right hip flexion: 110 with pain  Right hip external rotation: 50  Right hip internal rotation: 5  Muscle Strength   Abduction: 5/5   Adduction: 5/5   Flexion: 5/5     Tests   ENDER: positive    Other   Erythema: absent  Scars: absent  Sensation: normal  Pulse: present    Comments:  1-2 mm shortened compared to contralateral side  No overhanging abdominal pannus  Stinchfield: positive  FADIR: positive            Physical Exam  Vitals signs and nursing note reviewed  Constitutional:       Appearance: He is well-developed  HENT:      Head: Normocephalic and atraumatic  Eyes:      Conjunctiva/sclera: Conjunctivae normal       Pupils: Pupils are equal, round, and reactive to light  Neck:      Musculoskeletal: Normal range of motion and neck supple  Cardiovascular:      Rate and Rhythm: Normal rate  Pulmonary:      Effort: Pulmonary effort is normal  No respiratory distress  Musculoskeletal:      Comments: As noted in HPI   Skin:     General: Skin is warm and dry  Neurological:      Mental Status: He is alert and oriented to person, place, and time  Psychiatric:         Behavior: Behavior normal          I have personally reviewed pertinent films in PACS  X-ray of the right hip obtained on 08/26/2020 demonstrates severe end-stage degenerative change as evidenced by joint space narrowing with bone-on-bone contact superiorly, sclerosis, marginal osteophytosis, and deformation of the femoral head  There is no acute fracture, dislocation, lytic or blastic lesion

## 2020-08-27 ENCOUNTER — TELEPHONE (OUTPATIENT)
Dept: PAIN MEDICINE | Facility: CLINIC | Age: 45
End: 2020-08-27

## 2020-08-27 NOTE — TELEPHONE ENCOUNTER
----- Message from Jn Okeefe sent at 8/26/2020  6:15 PM EDT -----  Regarding: Right hip injection  Hello,    Please reach to Rusty Durbin to schedule an ultrasound guided right hip intra-articular injection  Thank you!

## 2020-12-23 ENCOUNTER — TELEMEDICINE (OUTPATIENT)
Dept: FAMILY MEDICINE CLINIC | Facility: CLINIC | Age: 45
End: 2020-12-23
Payer: COMMERCIAL

## 2020-12-23 DIAGNOSIS — R06.02 SHORTNESS OF BREATH WITH EXPOSURE TO COVID-19 VIRUS: ICD-10-CM

## 2020-12-23 DIAGNOSIS — Z20.822 SHORTNESS OF BREATH WITH EXPOSURE TO COVID-19 VIRUS: Primary | ICD-10-CM

## 2020-12-23 DIAGNOSIS — R06.02 SHORTNESS OF BREATH WITH EXPOSURE TO COVID-19 VIRUS: Primary | ICD-10-CM

## 2020-12-23 DIAGNOSIS — Z20.822 SHORTNESS OF BREATH WITH EXPOSURE TO COVID-19 VIRUS: ICD-10-CM

## 2020-12-23 PROCEDURE — U0003 INFECTIOUS AGENT DETECTION BY NUCLEIC ACID (DNA OR RNA); SEVERE ACUTE RESPIRATORY SYNDROME CORONAVIRUS 2 (SARS-COV-2) (CORONAVIRUS DISEASE [COVID-19]), AMPLIFIED PROBE TECHNIQUE, MAKING USE OF HIGH THROUGHPUT TECHNOLOGIES AS DESCRIBED BY CMS-2020-01-R: HCPCS | Performed by: STUDENT IN AN ORGANIZED HEALTH CARE EDUCATION/TRAINING PROGRAM

## 2020-12-23 PROCEDURE — 99213 OFFICE O/P EST LOW 20 MIN: CPT | Performed by: FAMILY MEDICINE

## 2020-12-25 LAB — SARS-COV-2 RNA SPEC QL NAA+PROBE: NOT DETECTED

## 2021-03-14 ENCOUNTER — APPOINTMENT (OUTPATIENT)
Dept: URGENT CARE | Facility: CLINIC | Age: 46
End: 2021-03-14
Payer: OTHER MISCELLANEOUS

## 2021-03-14 ENCOUNTER — APPOINTMENT (OUTPATIENT)
Dept: RADIOLOGY | Facility: CLINIC | Age: 46
End: 2021-03-14
Payer: COMMERCIAL

## 2021-03-14 DIAGNOSIS — S89.91XA INJURY OF RIGHT LOWER EXTREMITY, INITIAL ENCOUNTER: Primary | ICD-10-CM

## 2021-03-14 DIAGNOSIS — S89.91XA INJURY OF RIGHT LOWER EXTREMITY, INITIAL ENCOUNTER: ICD-10-CM

## 2021-03-14 DIAGNOSIS — M25.561 ACUTE PAIN OF RIGHT KNEE: ICD-10-CM

## 2021-03-14 PROCEDURE — 73552 X-RAY EXAM OF FEMUR 2/>: CPT

## 2021-03-14 PROCEDURE — 99213 OFFICE O/P EST LOW 20 MIN: CPT | Performed by: NURSE PRACTITIONER

## 2021-03-21 ENCOUNTER — APPOINTMENT (OUTPATIENT)
Dept: RADIOLOGY | Facility: CLINIC | Age: 46
End: 2021-03-21
Payer: COMMERCIAL

## 2021-03-21 ENCOUNTER — APPOINTMENT (OUTPATIENT)
Dept: URGENT CARE | Facility: CLINIC | Age: 46
End: 2021-03-21
Payer: OTHER MISCELLANEOUS

## 2021-03-21 DIAGNOSIS — M25.561 ACUTE PAIN OF RIGHT KNEE: Primary | ICD-10-CM

## 2021-03-21 DIAGNOSIS — M25.561 ACUTE PAIN OF RIGHT KNEE: ICD-10-CM

## 2021-03-21 PROCEDURE — 73564 X-RAY EXAM KNEE 4 OR MORE: CPT

## 2021-03-21 PROCEDURE — 99213 OFFICE O/P EST LOW 20 MIN: CPT | Performed by: NURSE PRACTITIONER

## 2021-03-22 ENCOUNTER — TRANSCRIBE ORDERS (OUTPATIENT)
Dept: ADMINISTRATIVE | Facility: HOSPITAL | Age: 46
End: 2021-03-22

## 2021-03-22 DIAGNOSIS — M25.561 PAIN IN RIGHT KNEE: Primary | ICD-10-CM

## 2021-03-22 DIAGNOSIS — S79.921A UNSPECIFIED INJURY OF RIGHT THIGH, INITIAL ENCOUNTER: ICD-10-CM

## 2021-03-29 ENCOUNTER — HOSPITAL ENCOUNTER (OUTPATIENT)
Dept: RADIOLOGY | Facility: IMAGING CENTER | Age: 46
Discharge: HOME | End: 2021-03-29
Payer: COMMERCIAL

## 2021-03-29 DIAGNOSIS — M25.561 PAIN IN RIGHT KNEE: ICD-10-CM

## 2021-03-29 DIAGNOSIS — S79.921A UNSPECIFIED INJURY OF RIGHT THIGH, INITIAL ENCOUNTER: ICD-10-CM

## 2021-03-29 PROCEDURE — A9585 GADOBUTROL INJECTION: HCPCS | Performed by: NURSE PRACTITIONER

## 2021-03-29 PROCEDURE — 73721 MRI JNT OF LWR EXTRE W/O DYE: CPT

## 2021-03-29 PROCEDURE — 73720 MRI LWR EXTREMITY W/O&W/DYE: CPT

## 2021-03-29 PROCEDURE — G1004 CDSM NDSC: HCPCS

## 2021-03-29 RX ADMIN — GADOBUTROL 10 ML: 604.72 INJECTION INTRAVENOUS at 08:46

## 2021-03-31 ENCOUNTER — APPOINTMENT (OUTPATIENT)
Dept: URGENT CARE | Facility: CLINIC | Age: 46
End: 2021-03-31
Payer: COMMERCIAL

## 2021-03-31 PROCEDURE — 99213 OFFICE O/P EST LOW 20 MIN: CPT | Performed by: NURSE PRACTITIONER

## 2021-04-02 VITALS
HEART RATE: 75 BPM | SYSTOLIC BLOOD PRESSURE: 123 MMHG | HEIGHT: 72 IN | BODY MASS INDEX: 29.31 KG/M2 | DIASTOLIC BLOOD PRESSURE: 75 MMHG | WEIGHT: 216.4 LBS

## 2021-04-02 DIAGNOSIS — M25.561 ACUTE PAIN OF RIGHT KNEE: ICD-10-CM

## 2021-04-02 DIAGNOSIS — T14.8XXA OSTEOCHONDRAL FRACTURE: ICD-10-CM

## 2021-04-02 DIAGNOSIS — S83.91XA SPRAIN OF RIGHT KNEE, UNSPECIFIED LIGAMENT, INITIAL ENCOUNTER: Primary | ICD-10-CM

## 2021-04-02 PROCEDURE — 99214 OFFICE O/P EST MOD 30 MIN: CPT | Performed by: ORTHOPAEDIC SURGERY

## 2021-04-02 RX ORDER — IBUPROFEN 200 MG
TABLET ORAL EVERY 6 HOURS PRN
COMMUNITY

## 2021-04-02 NOTE — PROGRESS NOTES
Assessment/Plan:  1  Sprain of right knee, unspecified ligament, initial encounter     2  Acute pain of right knee     3  Osteochondral fracture       Scribe Attestation    I,:  Jn Okeefe am acting as a scribe while in the presence of the attending physician :       I,:  Pavel Oro, DO personally performed the services described in this documentation    as scribed in my presence :           Rusty Durbin is a pleasant 49-year-old male who presents today for initial evaluation of acute right knee pain  After reviewing his imaging and performing a thorough history and physical exam I explained that he is suffering with a bony contusion and MCL sprain about his right knee  We discussed treatment options today and I recommended continued altered weight-bearing for him and avoidance of heavy lifting  He plans to use a cane for assistance with ambulation  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 but should use his hinged knee brace, a cane, and should avoid any heavy lifting  A note was provided for his employer  I would like to see him back in 4 weeks for repeat clinical evaluation  We did also discuss his underlying right hip osteoarthritis and I encouraged him to continue pursuing smoking cessation efforts in order to become a candidate for total hip arthroplasty  Subjective: Initial evaluation for right knee pain    Patient ID: Leonel Charles is a 50 y o  male  Who presents today for initial evaluation of acute right knee pain  He states that approximately 4 weeks ago I jacked handle struck the anterior aspect of his thigh causing him to fall to the ground  He landed on his side, did not hit his head, did not lose consciousness  Over the last 4 weeks, he has experienced mild aching pain about the anterior and anterolateral aspect of his right knee  He has been using a brace and ice which has been beneficial for him  He has been working over this time    He does state that his pain has been improving since the time of his injury  He denies any significant swelling or bruising about the knee  He denies any feelings of unsteadiness about his knee  He denies any mechanical symptoms about the knee  Review of Systems   Constitutional: Positive for activity change  Negative for chills, fever and unexpected weight change  HENT: Negative for hearing loss, nosebleeds and sore throat  Eyes: Negative for pain, redness and visual disturbance  Respiratory: Negative for cough, shortness of breath and wheezing  Cardiovascular: Negative for chest pain, palpitations and leg swelling  Gastrointestinal: Negative for abdominal pain, nausea and vomiting  Endocrine: Negative for polyphagia and polyuria  Genitourinary: Negative for dysuria and hematuria  Musculoskeletal: Positive for arthralgias and joint swelling  Negative for myalgias  See HPI   Skin: Negative for rash and wound  Neurological: Negative for dizziness, numbness and headaches  Psychiatric/Behavioral: Negative for decreased concentration and suicidal ideas  The patient is not nervous/anxious  History reviewed  No pertinent past medical history      Past Surgical History:   Procedure Laterality Date    HAND SURGERY         Family History   Problem Relation Age of Onset    No Known Problems Mother     No Known Problems Father        Social History     Occupational History    Not on file   Tobacco Use    Smoking status: Current Every Day Smoker     Packs/day: 0 50    Smokeless tobacco: Never Used   Substance and Sexual Activity    Alcohol use: Yes     Frequency: Monthly or less    Drug use: Never    Sexual activity: Not on file         Current Outpatient Medications:     ibuprofen (MOTRIN) 200 mg tablet, Take by mouth every 6 (six) hours as needed for mild pain, Disp: , Rfl:     No Known Allergies    Objective:  Vitals:    04/02/21 1505   BP: 123/75   Pulse: 75       Body mass index is 29 35 kg/m²  Right Knee Exam     Muscle Strength   The patient has normal right knee strength  Tenderness   The patient is experiencing tenderness in the lateral joint line  Range of Motion   Right knee extension: 0  Right knee flexion: 130  Tests   April:  Medial - negative Lateral - negative  Varus: negative Valgus: negative  Drawer:  Anterior - negative    Posterior - negative    Other   Erythema: absent  Scars: absent  Sensation: normal  Pulse: present  Swelling: none  Effusion: effusion present    Comments:  Stable at 0, 30 and 90 degrees  Neurovascularly in tact distally  No warmth, erythema or signs of infection  Parapatellar crepitance noted  Patellofemoral grind: positive          Observations     Right Knee   Positive for effusion  Physical Exam  Vitals signs and nursing note reviewed  Constitutional:       Appearance: He is well-developed  HENT:      Head: Normocephalic and atraumatic  Eyes:      General: No scleral icterus  Conjunctiva/sclera: Conjunctivae normal    Neck:      Musculoskeletal: Normal range of motion and neck supple  Cardiovascular:      Rate and Rhythm: Normal rate  Pulmonary:      Effort: Pulmonary effort is normal  No respiratory distress  Musculoskeletal:      Right knee: He exhibits effusion  Comments: As noted in HPI   Skin:     General: Skin is warm and dry  Neurological:      Mental Status: He is alert and oriented to person, place, and time  Psychiatric:         Behavior: Behavior normal          I have personally reviewed pertinent films in PACS  MRI of the right knee obtained on 03/29/2021 reviewed demonstrating subchondral marrow edema consistent with bony contusion as well as partial tearing the MCL  X-ray of the right knee obtained on 03/21/2021 reviewed demonstrating mild degenerative change  There is no acute fracture, dislocation, lytic or blastic lesion

## 2021-04-02 NOTE — LETTER
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

## 2021-04-18 ENCOUNTER — IMMUNIZATIONS (OUTPATIENT)
Dept: FAMILY MEDICINE CLINIC | Facility: HOSPITAL | Age: 46
End: 2021-04-18

## 2021-04-18 DIAGNOSIS — Z23 ENCOUNTER FOR IMMUNIZATION: Primary | ICD-10-CM

## 2021-04-18 PROCEDURE — 91300 SARS-COV-2 / COVID-19 MRNA VACCINE (PFIZER-BIONTECH) 30 MCG: CPT

## 2021-04-18 PROCEDURE — 0001A SARS-COV-2 / COVID-19 MRNA VACCINE (PFIZER-BIONTECH) 30 MCG: CPT

## 2021-04-28 ENCOUNTER — OFFICE VISIT (OUTPATIENT)
Dept: OBGYN CLINIC | Facility: CLINIC | Age: 46
End: 2021-04-28
Payer: OTHER MISCELLANEOUS

## 2021-04-28 VITALS
HEART RATE: 70 BPM | DIASTOLIC BLOOD PRESSURE: 80 MMHG | WEIGHT: 222 LBS | SYSTOLIC BLOOD PRESSURE: 118 MMHG | BODY MASS INDEX: 30.07 KG/M2 | HEIGHT: 72 IN

## 2021-04-28 DIAGNOSIS — S83.91XD SPRAIN OF RIGHT KNEE, UNSPECIFIED LIGAMENT, SUBSEQUENT ENCOUNTER: Primary | ICD-10-CM

## 2021-04-28 DIAGNOSIS — M25.561 ACUTE PAIN OF RIGHT KNEE: ICD-10-CM

## 2021-04-28 PROCEDURE — 99214 OFFICE O/P EST MOD 30 MIN: CPT | Performed by: ORTHOPAEDIC SURGERY

## 2021-04-28 NOTE — PROGRESS NOTES
Assessment/Plan:  1  Sprain of right knee, unspecified ligament, subsequent encounter     2  Acute pain of right knee       Scribe Attestation    I,:  Roly Li am acting as a scribe while in the presence of the attending physician :       I,:  Hi Chu, DO personally performed the services described in this documentation    as scribed in my presence :         Suzan Yi is a pleasant 50year old male who returns today for follow up evaluation of his right knee pain  I am very pleased with the improvement he reports at today's visit  I encouraged him to continue with activity to his tolerance and to use his brace for activity as needed  He may also return to work without restriction at this time and a note was provided for his employer  All of his questions and concerns were addressed today  We will see him back on an as-needed basis  Subjective: Follow-up evaluation for right knee pain    Patient ID: Mitchel Lamas is a 50 y o  male who returns today for follow-up evaluation of his right knee pain  At his last visit, he was diagnosed with a bony contusion and was instructed to alter his weight-bearing  At today's visit, he states that he is feeling much better  He does still experience some intermittent and mild pain about the medial aspect of his knee but states this quickly resolves  He has been able to return to activity such as golf without issue or limitation  He has been working on [employer] duty status using a cane but does state that he feels ready to increases duties there  He has been using ice and his hinged knee brace  He denies any new injury or trauma  Review of Systems   Constitutional: Positive for activity change  Negative for chills, fever and unexpected weight change  HENT: Negative for hearing loss, nosebleeds and sore throat  Eyes: Negative for pain, redness and visual disturbance  Respiratory: Negative for cough, shortness of breath and wheezing  Cardiovascular: Negative for chest pain, palpitations and leg swelling  Gastrointestinal: Negative for abdominal pain, nausea and vomiting  Endocrine: Negative for polyphagia and polyuria  Genitourinary: Negative for dysuria and hematuria  Musculoskeletal: Negative for arthralgias, joint swelling and myalgias  See HPI   Skin: Negative for rash and wound  Neurological: Negative for dizziness, numbness and headaches  Psychiatric/Behavioral: Negative for decreased concentration and suicidal ideas  The patient is not nervous/anxious  History reviewed  No pertinent past medical history  Past Surgical History:   Procedure Laterality Date    HAND SURGERY         Family History   Problem Relation Age of Onset    No Known Problems Mother     No Known Problems Father        Social History     Occupational History    Not on file   Tobacco Use    Smoking status: Current Every Day Smoker     Packs/day: 0 50    Smokeless tobacco: Never Used   Substance and Sexual Activity    Alcohol use: Yes     Frequency: Monthly or less    Drug use: Never    Sexual activity: Not on file         Current Outpatient Medications:     ibuprofen (MOTRIN) 200 mg tablet, Take by mouth every 6 (six) hours as needed for mild pain, Disp: , Rfl:     No Known Allergies    Objective:  Vitals:    04/28/21 1644   BP: 118/80   Pulse: 70       Body mass index is 30 11 kg/m²  Right Knee Exam     Tenderness   The patient is experiencing tenderness in the medial joint line  Range of Motion   Right knee extension: 0  Right knee flexion: 130       Tests   April:  Medial - negative Lateral - negative  Varus: negative Valgus: negative  Drawer:  Anterior - negative    Posterior - negative    Other   Erythema: absent  Scars: absent  Sensation: normal  Pulse: present  Swelling: none  Effusion: no effusion present    Comments:  Stable at 0, 30 and 90 degrees  Neurovascularly in tact distally  No warmth, erythema or signs of infection  Parapatellar crepitance noted  Patellofemoral grind: positive          Observations     Right Knee   Negative for effusion  Physical Exam  Vitals signs and nursing note reviewed  Constitutional:       Appearance: He is well-developed  HENT:      Head: Normocephalic and atraumatic  Eyes:      General: No scleral icterus  Conjunctiva/sclera: Conjunctivae normal    Neck:      Musculoskeletal: Normal range of motion and neck supple  Cardiovascular:      Rate and Rhythm: Normal rate  Pulmonary:      Effort: Pulmonary effort is normal  No respiratory distress  Musculoskeletal:      Right knee: He exhibits no effusion  Comments: As noted in HPI   Skin:     General: Skin is warm and dry  Neurological:      Mental Status: He is alert and oriented to person, place, and time     Psychiatric:         Behavior: Behavior normal

## 2021-04-28 NOTE — LETTER
April 28, 2021     Patient: Robb Martinez   YOB: 1975   Date of Visit: 4/28/2021       To Whom it May Concern:    Robb Martinez is under my professional care  He was seen in my office on 4/28/2021  He  may return to work without restriction at this time  If you have any questions or concerns, please don't hesitate to call           Sincerely,          Vitaliy García DO        CC: No Recipients

## 2021-05-09 ENCOUNTER — IMMUNIZATIONS (OUTPATIENT)
Dept: FAMILY MEDICINE CLINIC | Facility: HOSPITAL | Age: 46
End: 2021-05-09

## 2021-05-09 DIAGNOSIS — Z23 ENCOUNTER FOR IMMUNIZATION: Primary | ICD-10-CM

## 2021-05-09 PROCEDURE — 91300 SARS-COV-2 / COVID-19 MRNA VACCINE (PFIZER-BIONTECH) 30 MCG: CPT

## 2021-05-09 PROCEDURE — 0002A SARS-COV-2 / COVID-19 MRNA VACCINE (PFIZER-BIONTECH) 30 MCG: CPT

## 2024-10-07 ENCOUNTER — OFFICE VISIT (OUTPATIENT)
Dept: URGENT CARE | Facility: CLINIC | Age: 49
End: 2024-10-07
Payer: COMMERCIAL

## 2024-10-07 VITALS
DIASTOLIC BLOOD PRESSURE: 80 MMHG | HEART RATE: 78 BPM | OXYGEN SATURATION: 98 % | WEIGHT: 222 LBS | TEMPERATURE: 99.4 F | SYSTOLIC BLOOD PRESSURE: 120 MMHG | HEIGHT: 72 IN | RESPIRATION RATE: 16 BRPM | BODY MASS INDEX: 30.07 KG/M2

## 2024-10-07 DIAGNOSIS — J20.9 ACUTE BRONCHITIS WITH WHEEZING: Primary | ICD-10-CM

## 2024-10-07 PROCEDURE — G0382 LEV 3 HOSP TYPE B ED VISIT: HCPCS | Performed by: STUDENT IN AN ORGANIZED HEALTH CARE EDUCATION/TRAINING PROGRAM

## 2024-10-07 PROCEDURE — S9083 URGENT CARE CENTER GLOBAL: HCPCS | Performed by: STUDENT IN AN ORGANIZED HEALTH CARE EDUCATION/TRAINING PROGRAM

## 2024-10-07 RX ORDER — PREDNISONE 20 MG/1
40 TABLET ORAL DAILY
Qty: 10 TABLET | Refills: 0 | Status: SHIPPED | OUTPATIENT
Start: 2024-10-07 | End: 2024-10-12

## 2024-10-07 RX ORDER — AZITHROMYCIN 250 MG/1
TABLET, FILM COATED ORAL
Qty: 6 TABLET | Refills: 0 | Status: SHIPPED | OUTPATIENT
Start: 2024-10-07 | End: 2024-10-11

## 2024-10-08 NOTE — PROGRESS NOTES
[facility]        NAME: Pavel Akins is a 49 y.o. male  : 1975    MRN: [id number]  DATE: 2024  TIME: 8:08 PM    Assessment and Orders   Acute bronchitis with wheezing [J20.9]  1. Acute bronchitis with wheezing  azithromycin (ZITHROMAX) 250 mg tablet    predniSONE 20 mg tablet            Plan and Discussion      Symptoms and exam consistent with acute bronchitis in the setting of a 30 year smoking history. Wheezing on exam. Will treat with azithromycin and prednisone.      Risks and benefits discussed. Patient understands and agrees with the plan.     PATIENT INSTRUCTIONS      If tests have been performed at [facility], our office will contact you with results if changes need to be made to the care plan discussed with you at the visit.  You can review your full results on [facility].    Follow up with PCP.     If any of the following occur, please report to your nearest ED for evaluation or call 911.   Difficultly breathing or shortness of breath  Chest pain  Acutely worsening symptoms.         Chief Complaint     Chief Complaint   Patient presents with    URI     A week and a half of a cold, cough, and sore throat. Now experiencing a cough with green sputum.         History of Present Illness       URI   Associated symptoms include coughing, headaches and wheezing. Pertinent negatives include no chest pain or sore throat.   Cough  This is a new problem. The current episode started 1 to 4 weeks ago (1.5 weeks). The problem has been gradually worsening. The cough is Productive of sputum. Associated symptoms include headaches, nasal congestion and wheezing. Pertinent negatives include no chest pain, fever or sore throat. Risk factors for lung disease include smoking/tobacco exposure.       Review of Systems   Review of Systems   Constitutional:  Negative for fever.   HENT:  Negative for sore throat.    Respiratory:  Positive for cough and wheezing.    Cardiovascular:  Negative for  chest pain.   Neurological:  Positive for headaches.         Current Medications       Current Outpatient Medications:     azithromycin (ZITHROMAX) 250 mg tablet, Take 2 tablets today then 1 tablet daily x 4 days, Disp: 6 tablet, Rfl: 0    predniSONE 20 mg tablet, Take 2 tablets (40 mg total) by mouth daily for 5 days, Disp: 10 tablet, Rfl: 0    ibuprofen (MOTRIN) 200 mg tablet, Take by mouth every 6 (six) hours as needed for mild pain (Patient not taking: Reported on 10/7/2024), Disp: , Rfl:     Current Allergies     Allergies as of 10/07/2024    (No Known Allergies)            The following portions of the patient's history were reviewed and updated as appropriate: allergies, current medications, past family history, past medical history, past social history, past surgical history and problem list.     History reviewed. No pertinent past medical history.    Past Surgical History:   Procedure Laterality Date    HAND SURGERY         Family History   Problem Relation Age of Onset    No Known Problems Mother     No Known Problems Father          Medications have been verified.        Objective   /80 (BP Location: Left arm, Patient Position: Sitting, Cuff Size: Standard)   Pulse 78   Temp 99.4 °F (37.4 °C) (Temporal)   Resp 16   Ht 6' (1.829 m)   Wt 101 kg (222 lb)   SpO2 98%   BMI 30.11 kg/m²   No LMP for male patient.       Physical Exam     Physical Exam  Constitutional:       General: He is not in acute distress.  HENT:      Nose: Congestion present.      Mouth/Throat:      Pharynx: No oropharyngeal exudate or posterior oropharyngeal erythema.   Cardiovascular:      Rate and Rhythm: Normal rate and regular rhythm.   Pulmonary:      Effort: Pulmonary effort is normal.      Breath sounds: Wheezing (bilateral bases) present. No rhonchi.   Neurological:      General: No focal deficit present.      Mental Status: He is alert and oriented to person, place, and time.   Psychiatric:         Mood and Affect:  Mood normal.         Behavior: Behavior normal.               Rosy Gr DO